# Patient Record
Sex: MALE | Race: ASIAN | Employment: OTHER | URBAN - METROPOLITAN AREA
[De-identification: names, ages, dates, MRNs, and addresses within clinical notes are randomized per-mention and may not be internally consistent; named-entity substitution may affect disease eponyms.]

---

## 2021-01-26 ENCOUNTER — OFFICE VISIT (OUTPATIENT)
Dept: ORTHOPEDIC SURGERY | Age: 71
End: 2021-01-26
Payer: COMMERCIAL

## 2021-01-26 DIAGNOSIS — G89.29 CHRONIC PAIN OF RIGHT THUMB: Primary | ICD-10-CM

## 2021-01-26 DIAGNOSIS — M79.644 CHRONIC PAIN OF RIGHT THUMB: Primary | ICD-10-CM

## 2021-01-26 PROCEDURE — 1123F ACP DISCUSS/DSCN MKR DOCD: CPT | Performed by: ORTHOPAEDIC SURGERY

## 2021-01-26 PROCEDURE — 4040F PNEUMOC VAC/ADMIN/RCVD: CPT | Performed by: ORTHOPAEDIC SURGERY

## 2021-01-26 PROCEDURE — 3017F COLORECTAL CA SCREEN DOC REV: CPT | Performed by: ORTHOPAEDIC SURGERY

## 2021-01-26 PROCEDURE — 4004F PT TOBACCO SCREEN RCVD TLK: CPT | Performed by: ORTHOPAEDIC SURGERY

## 2021-01-26 PROCEDURE — G8484 FLU IMMUNIZE NO ADMIN: HCPCS | Performed by: ORTHOPAEDIC SURGERY

## 2021-01-26 PROCEDURE — G8427 DOCREV CUR MEDS BY ELIG CLIN: HCPCS | Performed by: ORTHOPAEDIC SURGERY

## 2021-01-26 PROCEDURE — 20600 DRAIN/INJ JOINT/BURSA W/O US: CPT | Performed by: ORTHOPAEDIC SURGERY

## 2021-01-26 PROCEDURE — 99203 OFFICE O/P NEW LOW 30 MIN: CPT | Performed by: ORTHOPAEDIC SURGERY

## 2021-01-26 PROCEDURE — G8421 BMI NOT CALCULATED: HCPCS | Performed by: ORTHOPAEDIC SURGERY

## 2021-01-26 RX ORDER — LIDOCAINE HYDROCHLORIDE 10 MG/ML
20 INJECTION, SOLUTION INFILTRATION; PERINEURAL ONCE
Status: COMPLETED | OUTPATIENT
Start: 2021-01-26 | End: 2021-01-26

## 2021-01-26 RX ORDER — TRIAMCINOLONE ACETONIDE 40 MG/ML
40 INJECTION, SUSPENSION INTRA-ARTICULAR; INTRAMUSCULAR ONCE
Status: COMPLETED | OUTPATIENT
Start: 2021-01-26 | End: 2021-01-26

## 2021-01-26 RX ADMIN — LIDOCAINE HYDROCHLORIDE 20 ML: 10 INJECTION, SOLUTION INFILTRATION; PERINEURAL at 15:55

## 2021-01-26 RX ADMIN — TRIAMCINOLONE ACETONIDE 40 MG: 40 INJECTION, SUSPENSION INTRA-ARTICULAR; INTRAMUSCULAR at 15:56

## 2021-01-26 NOTE — PROGRESS NOTES
Chief Complaint   Patient presents with    Finger Pain     Right thumb       HISTORY OF PRESENT Beverly Bo is a  right-hand-dominant patient, retired, here for evaluation of pain in his Right thumb(s)  that began approximately 6 months ago. This has become very sharp and stabbing discomfort especially when grasping objects. The patient denies any numbness or tingling. The patient denies any recent injury . Pain isintermittent, typically moderate in intensity. Symptoms are worsening over time. He denies any specific numbness or tingling in his hand  The pain is not radiating  No specific injury or event leading to the onset of symptoms  Treatment to date has included bracing as well as oral anti-inflammatory medication and activity modifications  No other complaints reported today and he is here today to discuss additional treatment options      ROS: Pertinent items are noted in HPI  Review of systems reviewed from Patient History Form dated on 1/26/2021 and available in the patient's chart under the Media tab. No past medical history on file. PHYSICAL EXAMINATION:   Gen/Psych: Examination reveals a pleasant individual in no acute distress. The patient is oriented to time, place and person. The patient's mood and affect are appropriate. Lymph: The lymphatic examination bilaterally reveals all areas to be without enlargement or induration. Skin intact without lymphadenopathy, discoloration, or abnormal temperature. Vascular: There is intact, symmetric circulation in both upper extremities. Musculoskeletal       Cervical spine, shoulders, elbows, wrist:  satisfactory pain-free range of motion,                                                                           strength, and stability        Right thumb(s): Moderate tenderness is elicited with CMC grind and mild crepitus        There is pain on firm pressure over the trapeziometacarpal joint.  Satisfactory stability exists at the MP joint with no hyperextension and no crepitus with circumduction    5 out of 5 strength EPL FPL thumb adduction  5 5 FDS FDP EDC interossei all fingers and thumb  No thenar atrophy or intrinsic muscle wasting  Appropriate finger tenodesis and resting cascade  . There is no specific anatomic snuffbox tenderness at the right wrist and no pain with radial and ulnar deviation of the wrist on today's examination          Contralateral thumb:  No tenderness with CMC grind or firm pressure over                                  trapeziometacarpal joint. Satisfactory stability exists at MP joint. DIAGNOSTIC TESTING: 3 views of Right thumb(s): reveal degenerative change at the Aia 16 joint without obvious acute fracture. No degenerative changes at the thumb MP joint  There are also degenerative changes radiocarpal specifically at radial scaphoid interval with narrowing and radial styloid beaking       IMPRESSION AND PLAN: 69-year-old male presenting with history of right thumb base pain now for approximately 6 months without injury  1. Degenerative arthritis of Right thumb(s). We discussed this common entity and appropriate conservative and surgical options. Appropriate initial steps include activity modification, rest, splinting, hand therapy, and injection. I also recommend utilizing  modifiers that decrease thumb pinch stress. Surgical intervention can usually be reserved for longstanding recalcitrant cases. Appropriate followup plans are discussed with the patient depending on the level of progress with the conservative care. I also discussed with the patient today his findings demonstrating radiocarpal degenerative changes but he is not symptomatic with regards to his right wrist arthritis today    He is most symptomatic at the right thumb base and we discussed treatment options including spectrum of both conservative and operative management.   The patient would like to proceed with conservative management for now, he was provided with several options for bracing activity modification strategies, topical or oral anti-inflammatory medications and we also discussed corticosteroid injection today which the patient is amenable for  The risks and benefits of steroid injection were discussed thoroughly. Risks discussed included infection, adverse drug reaction, increased pain post-injection, skin de-pigmentation, fatty atrophy, and persistent clinical symptoms despite injection. The injection was given after cleansing the skin with alcohol. The patients right thumb CMC joint was prepped for steroid injection. Using sterile technique, the right thumb CMC joint was injected with a mixture of 0.8 ml of 40mg/ml Kenalog and 1 mL 1% lidocaine. Appropriate post injection instructions were given. The patient tolerated the injection well and a Band-aid was placed.    Appropriate follow-up plans were discussed including plan for follow-up within the next 6 weeks particularly if any worsening symptoms, questions concerns or other changes

## 2021-06-23 ENCOUNTER — OFFICE VISIT (OUTPATIENT)
Dept: INFECTIOUS DISEASES | Age: 71
End: 2021-06-23
Payer: COMMERCIAL

## 2021-06-23 VITALS
TEMPERATURE: 98.2 F | HEART RATE: 86 BPM | BODY MASS INDEX: 32.78 KG/M2 | OXYGEN SATURATION: 96 % | WEIGHT: 242 LBS | HEIGHT: 72 IN | DIASTOLIC BLOOD PRESSURE: 92 MMHG | SYSTOLIC BLOOD PRESSURE: 158 MMHG | RESPIRATION RATE: 20 BRPM

## 2021-06-23 DIAGNOSIS — R53.83 FATIGUE, UNSPECIFIED TYPE: Primary | ICD-10-CM

## 2021-06-23 DIAGNOSIS — R53.83 FATIGUE, UNSPECIFIED TYPE: ICD-10-CM

## 2021-06-23 DIAGNOSIS — R05.9 COUGH: ICD-10-CM

## 2021-06-23 DIAGNOSIS — U09.9 POST-COVID SYNDROME: ICD-10-CM

## 2021-06-23 LAB — TSH REFLEX FT4: 0.53 UIU/ML (ref 0.27–4.2)

## 2021-06-23 PROCEDURE — 3017F COLORECTAL CA SCREEN DOC REV: CPT | Performed by: INTERNAL MEDICINE

## 2021-06-23 PROCEDURE — G8417 CALC BMI ABV UP PARAM F/U: HCPCS | Performed by: INTERNAL MEDICINE

## 2021-06-23 PROCEDURE — 1036F TOBACCO NON-USER: CPT | Performed by: INTERNAL MEDICINE

## 2021-06-23 PROCEDURE — 4040F PNEUMOC VAC/ADMIN/RCVD: CPT | Performed by: INTERNAL MEDICINE

## 2021-06-23 PROCEDURE — 1123F ACP DISCUSS/DSCN MKR DOCD: CPT | Performed by: INTERNAL MEDICINE

## 2021-06-23 PROCEDURE — G8427 DOCREV CUR MEDS BY ELIG CLIN: HCPCS | Performed by: INTERNAL MEDICINE

## 2021-06-23 PROCEDURE — 99204 OFFICE O/P NEW MOD 45 MIN: CPT | Performed by: INTERNAL MEDICINE

## 2021-06-23 RX ORDER — OMEPRAZOLE 40 MG/1
40 CAPSULE, DELAYED RELEASE ORAL
COMMUNITY

## 2021-06-23 RX ORDER — CLONIDINE HYDROCHLORIDE 0.1 MG/1
0.1 TABLET ORAL DAILY
COMMUNITY

## 2021-06-23 RX ORDER — ASPIRIN 81 MG/1
TABLET ORAL
COMMUNITY
Start: 2021-04-07

## 2021-06-23 RX ORDER — DEXAMETHASONE 4 MG/1
8 TABLET ORAL
COMMUNITY
Start: 2021-06-22 | End: 2021-06-26

## 2021-06-23 RX ORDER — AMLODIPINE BESYLATE 5 MG/1
5 TABLET ORAL DAILY
COMMUNITY

## 2021-06-23 RX ORDER — IBUPROFEN 400 MG/1
800 TABLET ORAL EVERY 6 HOURS PRN
COMMUNITY

## 2021-06-23 RX ORDER — ALBUTEROL SULFATE 90 UG/1
2 AEROSOL, METERED RESPIRATORY (INHALATION) EVERY 4 HOURS PRN
COMMUNITY
Start: 2021-06-22

## 2021-06-23 NOTE — PROGRESS NOTES
Infectious Diseases Consult Note    Reason for Consult:   Weakness   Requesting Physician:   Dr Joy Benedict  Primary Care Physician:  Rochelle Caro MD  History Obtained From:   Patient, EPIC    CHIEF COMPLAINT:      Chief Complaint   Patient presents with    New Patient     referred by Rochelle Caro MD     Fatigue     had Covid 9/2020 , patient states he has also had a fever the last 3 days        HISTORY OF PRESENT ILLNESS:      71 yo man   Hx HTN, HL, fatigue, obesity (BMI 32), OA (L AUTUMN, lower back)    COVID-19 infection, hosp [facility] 10/4-12/20, rx RDV, CP, + PE. Cardiac cath 2/25 - ok  F/u Pul 4/14 - better   Labs 3/29 - LFT ok    PCP 5/26 - fatigue, referred pt to me  Labs 5/28 CBC normal, TSH 7.3 / T4 1.01, ESR 24, CRP 10.5  ED 6/22 - cough, dx bronchitis, labs ok, CXR neg, rx dexamethasone 8 mg x 4 days, albuterol     Today 6/23 -   c/o cough - had cough x wks, recurrent x 1 week    C/o teeth hurt with chewing, mostly L upper. Has seen Dentist and had x-ray, oder noc bite guard  COVID-19 vaccine - around Feb, [facility]  No fever / chills     Past Medical History:    HTN, DM, HL, COVID-19    Past Surgical History:    No past surgical history on file.     Current Medications:    Current Outpatient Medications   Medication Sig Dispense Refill    albuterol sulfate  (90 Base) MCG/ACT inhaler Inhale 2 puffs into the lungs every 4 hours as needed      dexamethasone (DECADRON) 4 MG tablet Take 8 mg by mouth daily (with breakfast)      omeprazole (PRILOSEC) 40 MG delayed release capsule Take 40 mg by mouth every morning (before breakfast)      ibuprofen (ADVIL;MOTRIN) 400 MG tablet Take 400 mg by mouth every 6 hours as needed      ASPIRIN ADULT LOW STRENGTH 81 MG EC tablet TAKE 1 TABLET BY MOUTH EVERY DAY      cloNIDine (CATAPRES) 0.1 MG tablet Take 0.1 mg by mouth 2 times daily      amLODIPine (NORVASC) 10 MG tablet Take 10 mg by mouth daily       No current facility-administered medications for this visit. Allergies:  Ceftriaxone    Social History:    TOBACCO:   None    ETOH:    Occasional    DRUGS:    None   MARITAL STATUS:        OCCUPATION:             Family History:   No immunodeficiency     REVIEW OF SYSTEMS:    No fever / chills / sweats. No weight loss. No visual change, eye pain, eye discharge. No oral lesion, sore throat, dysphagia. Denies cough / sputum. Denies chest pain, palpitations. Denies n / v / abd pain. No diarrhea. Denies dysuria or change in urinary function. Denies joint swelling or pain. No myalgia, arthralgia. Denies skin changes, itching  Denies new / worse depression, psychiatric symptoms  Denies focal weakness, sensory change or other neurologic symptom  No symptoms endocrinopathy. No symptoms hematologic disease. PHYSICAL EXAM:    Vitals:  See intake vitals including weight    GENERAL: No apparent distress. HEENT: Membranes moist, no oral lesion - tender at gums inner / outer L premolar (around #15)  NECK:  Supple  LYMPH: No adenopathy   LUNGS: Clear b/l, no rales, no dullness  CARDIAC: RRR, no murmur appreciated  ABD:  + BS, soft / NT  EXT:  No rash, no edema, no lesions - no joint swelling  NEURO: No focal neurologic findings  PSYCH: Orientation, sensorium, mood normal      DATA:    See EPIC    IMPRESSION    Hx HTN, DM, HL  Hx COVID-19    Fatigue   post-COVID  Depression - PHQ-9 reviewed  Mild elevated ESR / CRP 5/28 - non-specific    R/o recent EBV / CMV infection    RECOMMENDATIONS:      Take steroid / alb MDI - rx 6/22  Repeat thyroid - had elevated TSH, poss hypothyroid  Check EBV, CMV, HIV serology    Reassurance  Exercise     - Spent 50 minutes on visit (including history, physical exam, review of data, development and implementation of treatment plan and coordination of care. - Over 50% of time spent in pt counseling and education.     Miriam Recio MD

## 2021-06-24 LAB
HIV AG/AB: NORMAL
HIV ANTIGEN: NORMAL
HIV-1 ANTIBODY: NORMAL
HIV-2 AB: NORMAL

## 2021-06-25 LAB
CYTOMEGALOVIRUS IGG ANTIBODY: >10 U/ML
CYTOMEGALOVIRUS IGM ANTIBODY: 8.2 AU/ML
EPSTEIN BARR VIRUS NUCLEAR AB IGG: 311 U/ML (ref 0–21.9)
EPSTEIN-BARR EARLY ANTIGEN ANTIBODY: <5 U/ML (ref 0–10.9)
EPSTEIN-BARR VCA IGG: >750 U/ML (ref 0–21.9)
EPSTEIN-BARR VCA IGM: 17 U/ML (ref 0–43.9)